# Patient Record
(demographics unavailable — no encounter records)

---

## 2025-03-13 NOTE — PHYSICAL EXAM
[de-identified] : Right Shoulder: Constitutional: The patient is healthy-appearing and in no apparent distress.   Cardiovascular System:  The capillary refill is less than 2 seconds.   Skin:  There are no skin abnormalities.  C-Spine/Neck:  Active Range of Motion: Flexion				50 Extension			60 Lateral rotation			80    Right Shoulder:  Inspection:  There is no atrophy, erythema, warmth, swelling. There is no scapular winging. There is no AC prominence.   Bony Palpation:  There is no tenderness of the clavicle. There is no tenderness of the acromioclavicular joint. There is no tenderness of the greater tuberosity.  There is no tenderness of the bicipital groove.   Soft Tissue Palpation:  There is no tenderness of the trapezius. There is no tenderness of the rhomboid. There is no tenderness of the subacromial bursa.   Active Range of Motion:  Forward flexion- 				165  Abduction-					130 External rotation at 0 degrees abduction-	80  Internal rotation at 0 degrees abduction-	80  Passive Range of Motion:  Forward flexion- 			180  Abduction-				150 External rotation at 0 deg abduction-	80  Internal rotation at 0 deg abduction-	80  Special Tests:  Hawkin's  				Positive  Neer's  				Positive  Speed's  				Negative AC cross-over 			            Negative [place]'s  				Negative  Stability:  There is no general laxity.  There is no anterior apprehension.  Strength:  Supraspinatus abduction 		4/5 External rotation at 0 deg abduction 	4/5 Internal rotation at 0 deg abduction	5/5 Scapular elevation 			5/5   Neurological System:   There is normal sensation to light touch C5-T1.   Stability:  There is no general laxity.   Psychiatric:  The patient demonstrates a normal mood and affect and is active and alert. [de-identified] : MRI right humerus ([facility]):  There is a complete full-thickness tear of the supraspinatus with retraction off 2.5 - 3 cm.

## 2025-03-13 NOTE — ASSESSMENT
[FreeTextEntry1] : Discussed with patient at length symptoms and diagnosis.  Lengthy discussion with patient regarding nonoperative and operative risks and benefits as well as surgical expectations and postop protocols and expectations, including the possibility that surgery may fail to satisfactorily resolve patient's condition / symptoms.   Patient informed that radiologic imaging as well as physical exam are aids to helping determine treatment plans/recommendations and that intra-operative evaluation will be undertaken and any surgical treatment will take intra-operative evaluation into consideration to aid in improving the patient's symptoms/condition.  Patient expresses understanding and patient's questions were answered.  Patient ELECTS to proceed with surgery.

## 2025-03-13 NOTE — HISTORY OF PRESENT ILLNESS
[de-identified] : Initial visit: RIGHT shoulder pain and weakness Reason: no falls or injuries  Duration: 1 month  Prior studies: mri done in [facility] Symptoms throbbing and pulling  Aggravating Fx:  heavy stuff  Alleviating Fx:  resting  Pain level: 2/10 Pain medication:  anti flammatory  Medical Hx: no  Surgical Hx: no  Current Medication: Allergies: no

## 2025-04-29 NOTE — HISTORY OF PRESENT ILLNESS
[de-identified] : Status post right shoulder arthroscopy with rotator cuff repair double row subacromial compression arthroscopic biceps tenodesis [de-identified] : Patient is 11 days postop states overall his pain is actively controlled is not taking any current pain medication maintaining the sling at all times denies any paresthesias.   [de-identified] : On exam of the right shoulder sling is intact wounds are healed.  Normal sensory light touch C5 T1 full elbow range of motion biceps position maintained minimal swelling consistent with expectation.  Active range of the shoulder forward flexion to 30, external rotation to 20 and internal rotation to 80 with passive range of motion forward flexion to 60 external rotation to 30 and internal rotation to 80 [de-identified] : Status post right shoulder arthroscopy with rotator cuff repair double row subacromial compression arthroscopic biceps tenodesis [de-identified] : Discussed at length with patient postop protocol she is to discontinue the sling begin physical therapy follow-up in 3 weeks activity restrictions reviewed and she expresses understanding

## 2025-05-05 NOTE — HISTORY OF PRESENT ILLNESS
[de-identified] : Status post right shoulder arthroscopy with rotator cuff repair double row subacromial compression arthroscopic biceps tenodesis [de-identified] : Patient is a she is doing rather well but noticed some discomfort and swelling in her hand atraumatic over the last week [de-identified] : On exam of the right hand there is mild swelling of the distal forearm and there is full range of motion 5 out of 5 wrist flexion extension pronation supination  and intrinsics.  There is normal sensation light touch throughout [de-identified] : Status post right shoulder arthroscopy with rotator cuff repair double row subacromial compression arthroscopic biceps tenodesis [de-identified] : Discussed at length with patient this is most likely secondary to overuse of the forearm with secondary swelling while she was in the sling he elects to observe given home exercises no improvement or any worsening she is to call the office

## 2025-05-22 NOTE — HISTORY OF PRESENT ILLNESS
[de-identified] : Status post right shoulder arthroscopy with rotator cuff repair double row subacromial compression arthroscopic biceps tenodesis [de-identified] : Patient states overall she is doing rather well and overall improving going to physical therapy [de-identified] : On exam of the right shoulder active forward flexion to 65 degrees passive to 130 full internal rotation external rotation actively to 40 passive to 60 with 5 out of 5 strength throughout [de-identified] : Status post right shoulder arthroscopy with rotator cuff repair double row subacromial compression arthroscopic biceps tenodesis [de-identified] : Discussed at length with patient persistent stiffness and recommendation for physical therapy to advance range of motion specifically and recommendation for home pulley as well.  Patient to follow-up in 1 month for motion check

## 2025-05-22 NOTE — HISTORY OF PRESENT ILLNESS
[de-identified] : Status post right shoulder arthroscopy with rotator cuff repair double row subacromial compression arthroscopic biceps tenodesis [de-identified] : Patient states overall she is doing rather well and overall improving going to physical therapy [de-identified] : On exam of the right shoulder active forward flexion to 65 degrees passive to 130 full internal rotation external rotation actively to 40 passive to 60 with 5 out of 5 strength throughout [de-identified] : Status post right shoulder arthroscopy with rotator cuff repair double row subacromial compression arthroscopic biceps tenodesis [de-identified] : Discussed at length with patient persistent stiffness and recommendation for physical therapy to advance range of motion specifically and recommendation for home pulley as well.  Patient to follow-up in 1 month for motion check

## 2025-05-22 NOTE — HISTORY OF PRESENT ILLNESS
[de-identified] : Status post right shoulder arthroscopy with rotator cuff repair double row subacromial compression arthroscopic biceps tenodesis [de-identified] : Patient states overall she is doing rather well and overall improving going to physical therapy [de-identified] : On exam of the right shoulder active forward flexion to 65 degrees passive to 130 full internal rotation external rotation actively to 40 passive to 60 with 5 out of 5 strength throughout [de-identified] : Status post right shoulder arthroscopy with rotator cuff repair double row subacromial compression arthroscopic biceps tenodesis [de-identified] : Discussed at length with patient persistent stiffness and recommendation for physical therapy to advance range of motion specifically and recommendation for home pulley as well.  Patient to follow-up in 1 month for motion check

## 2025-06-11 NOTE — HISTORY OF PRESENT ILLNESS
[de-identified] : Status post right shoulder arthroscopy with rotator cuff repair double row subacromial compression arthroscopic biceps tenodesis [de-identified] : Patient states overall she is doing rather well and overall improving going to physical therapy [de-identified] : On exam of the right shoulder active forward flexion to 165 degrees passive to 175 full internal rotation external rotation actively to 80 passive to 90 with 5 out of 5 strength throughout [de-identified] : Status post right shoulder arthroscopy with rotator cuff repair double row subacromial compression arthroscopic biceps tenodesis [de-identified] : Discussed at length with patient overall improvement.  [facility] and home exercises.

## 2025-06-11 NOTE — HISTORY OF PRESENT ILLNESS
[de-identified] : Status post right shoulder arthroscopy with rotator cuff repair double row subacromial compression arthroscopic biceps tenodesis [de-identified] : Patient states overall she is doing rather well and overall improving going to physical therapy [de-identified] : On exam of the right shoulder active forward flexion to 165 degrees passive to 175 full internal rotation external rotation actively to 80 passive to 90 with 5 out of 5 strength throughout [de-identified] : Status post right shoulder arthroscopy with rotator cuff repair double row subacromial compression arthroscopic biceps tenodesis [de-identified] : Discussed at length with patient overall improvement.  [facility] and home exercises.

## 2025-07-30 NOTE — HISTORY OF PRESENT ILLNESS
[de-identified] : Status post right shoulder arthroscopy with rotator cuff repair double row subacromial compression arthroscopic biceps tenodesis [de-identified] : Patient states overall she is doing rather well and overall improving going to physical therapy [de-identified] : On exam of the right shoulder active forward flexion to 165 degrees passive to 175 full internal rotation external rotation actively to 80 passive to 90 with 5 out of 5 strength throughout [de-identified] : Status post right shoulder arthroscopy with rotator cuff repair double row subacromial compression arthroscopic biceps tenodesis [de-identified] : Discussed at length with patient overall improvement.  [facility] and home exercises.

## 2025-07-30 NOTE — HISTORY OF PRESENT ILLNESS
[de-identified] : Status post right shoulder arthroscopy with rotator cuff repair double row subacromial compression arthroscopic biceps tenodesis [de-identified] : Patient states overall she is doing rather well and overall improving going to physical therapy [de-identified] : On exam of the right shoulder active forward flexion to 165 degrees passive to 175 full internal rotation external rotation actively to 80 passive to 90 with 5 out of 5 strength throughout [de-identified] : Status post right shoulder arthroscopy with rotator cuff repair double row subacromial compression arthroscopic biceps tenodesis [de-identified] : Discussed at length with patient overall improvement.  [facility] PT and home exercises.